# Patient Record
Sex: FEMALE | Race: WHITE | NOT HISPANIC OR LATINO | Employment: OTHER | URBAN - METROPOLITAN AREA
[De-identification: names, ages, dates, MRNs, and addresses within clinical notes are randomized per-mention and may not be internally consistent; named-entity substitution may affect disease eponyms.]

---

## 2019-07-18 ENCOUNTER — APPOINTMENT (EMERGENCY)
Dept: RADIOLOGY | Facility: HOSPITAL | Age: 74
End: 2019-07-18
Payer: COMMERCIAL

## 2019-07-18 ENCOUNTER — HOSPITAL ENCOUNTER (EMERGENCY)
Facility: HOSPITAL | Age: 74
Discharge: HOME | End: 2019-07-18
Attending: EMERGENCY MEDICINE | Admitting: EMERGENCY MEDICINE
Payer: COMMERCIAL

## 2019-07-18 ENCOUNTER — APPOINTMENT (EMERGENCY)
Dept: CT IMAGING | Facility: HOSPITAL | Age: 74
End: 2019-07-18
Payer: COMMERCIAL

## 2019-07-18 VITALS
HEART RATE: 60 BPM | WEIGHT: 138 LBS | BODY MASS INDEX: 27.09 KG/M2 | HEIGHT: 60 IN | OXYGEN SATURATION: 99 % | TEMPERATURE: 98.2 F | SYSTOLIC BLOOD PRESSURE: 150 MMHG | DIASTOLIC BLOOD PRESSURE: 78 MMHG | RESPIRATION RATE: 16 BRPM
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
## 2019-07-18 DIAGNOSIS — S46.911A RIGHT SHOULDER STRAIN: ICD-10-CM

## 2019-07-18 DIAGNOSIS — S01.81XA FACIAL LACERATION: ICD-10-CM

## 2019-07-18 PROCEDURE — 72125 CT NECK SPINE W/O DYE: CPT

## 2019-07-18 PROCEDURE — 99284 EMERGENCY DEPT VISIT MOD MDM: CPT | Performed by: EMERGENCY MEDICINE

## 2019-07-18 PROCEDURE — 99284 EMERGENCY DEPT VISIT MOD MDM: CPT

## 2019-07-18 PROCEDURE — 73030 X-RAY EXAM OF SHOULDER: CPT

## 2019-07-18 PROCEDURE — 96372 THER/PROPH/DIAG INJ SC/IM: CPT

## 2019-07-18 PROCEDURE — 12011 RPR F/E/E/N/L/M 2.5 CM/<: CPT | Performed by: EMERGENCY MEDICINE

## 2019-07-18 PROCEDURE — 70450 CT HEAD/BRAIN W/O DYE: CPT

## 2019-07-18 RX ORDER — KETOROLAC TROMETHAMINE 30 MG/ML
15 INJECTION, SOLUTION INTRAMUSCULAR; INTRAVENOUS ONCE
Status: COMPLETED | OUTPATIENT
Start: 2019-07-18 | End: 2019-07-18

## 2019-07-18 RX ORDER — IBUPROFEN 400 MG/1
400 TABLET ORAL ONCE
Status: DISCONTINUED | OUTPATIENT
Start: 2019-07-18 | End: 2019-07-18

## 2019-07-18 RX ORDER — LIDOCAINE HYDROCHLORIDE 10 MG/ML
10 INJECTION, SOLUTION EPIDURAL; INFILTRATION; INTRACAUDAL; PERINEURAL ONCE
Status: DISCONTINUED | OUTPATIENT
Start: 2019-07-18 | End: 2019-07-18 | Stop reason: HOSPADM

## 2019-07-18 RX ADMIN — KETOROLAC TROMETHAMINE 15 MG: 30 INJECTION, SOLUTION INTRAMUSCULAR at 14:02

## 2019-07-18 NOTE — ED PROVIDER NOTES
History  Chief Complaint   Patient presents with    Fall     PT reports being tripped by her dog, falling omn her right side onto pvement  C/o right shoulder pain, lac to side of head, and knee pain  Denies thinner or LOC  47-year-old female presenting to the emergency department for evaluation of fall and head injury  Patient had mechanical fall as she tripped over her dog, fell onto her right upper extremity and also struck her head  Patient denies loss of consciousness syncope or presyncopal type symptoms  Patient sustained a small laceration to her right temple  Patient also injured her right shoulder  Patient denies any numbness weakness or tingling in any of her extremities  Has full strength and sensation of the right upper extremity  Denies any dizziness nausea vomiting or visual changes  Patient does not know her last tetanus is but is declining a tetanus update today  Patient does not take any blood thinning medications  Patient does have history of rotator cuff surgery on the affected shoulder  None       Past Medical History:   Diagnosis Date    Hyperlipidemia        History reviewed  No pertinent surgical history  History reviewed  No pertinent family history  I have reviewed and agree with the history as documented  Social History     Tobacco Use    Smoking status: Never Smoker    Smokeless tobacco: Never Used   Substance Use Topics    Alcohol use: Yes     Comment: occ    Drug use: Never        Review of Systems   Constitutional: Negative for appetite change, chills, fatigue and fever  HENT: Negative for sneezing and sore throat  Eyes: Negative for visual disturbance  Respiratory: Negative for cough, choking, chest tightness, shortness of breath and wheezing  Cardiovascular: Negative for chest pain and palpitations  Gastrointestinal: Negative for abdominal pain, constipation, diarrhea, nausea and vomiting     Genitourinary: Negative for difficulty urinating and dysuria  Musculoskeletal: Positive for arthralgias  Skin: Positive for wound  Neurological: Negative for dizziness, weakness, light-headedness, numbness and headaches  All other systems reviewed and are negative  Physical Exam  Physical Exam   Constitutional: She is oriented to person, place, and time  She appears well-developed and well-nourished  No distress  HENT:   Head: Normocephalic  Mouth/Throat: Oropharynx is clear and moist    Eyes: Pupils are equal, round, and reactive to light  EOM are normal    Neck: No JVD present  No tracheal deviation present  Cardiovascular: Normal rate, regular rhythm, normal heart sounds and intact distal pulses  Exam reveals no gallop and no friction rub  No murmur heard  Pulmonary/Chest: Effort normal and breath sounds normal  No respiratory distress  She has no wheezes  She has no rales  Abdominal: Soft  Bowel sounds are normal  She exhibits no distension  There is no tenderness  There is no rebound and no guarding  Musculoskeletal:        Right shoulder: She exhibits tenderness  She exhibits normal range of motion, no bony tenderness, no swelling, no effusion and no deformity  Neurological: She is alert and oriented to person, place, and time  No cranial nerve deficit  She exhibits normal muscle tone  Skin: Skin is warm and dry  She is not diaphoretic  No pallor  Psychiatric: She has a normal mood and affect  Her behavior is normal    Nursing note and vitals reviewed        Vital Signs  ED Triage Vitals   Temperature Pulse Respirations Blood Pressure SpO2   07/18/19 1301 07/18/19 1301 07/18/19 1301 07/18/19 1301 07/18/19 1301   98 2 °F (36 8 °C) 60 16 150/78 99 %      Temp Source Heart Rate Source Patient Position - Orthostatic VS BP Location FiO2 (%)   07/18/19 1301 07/18/19 1301 07/18/19 1301 07/18/19 1301 --   Oral Monitor Sitting Left arm       Pain Score       07/18/19 1302       Worst Possible Pain           Vitals: 07/18/19 1301   BP: 150/78   Pulse: 60   Patient Position - Orthostatic VS: Sitting         Visual Acuity      ED Medications  Medications   ketorolac (TORADOL) injection 15 mg (15 mg Intramuscular Given 7/18/19 1402)       Diagnostic Studies  Results Reviewed     None                 XR shoulder 2+ views RIGHT   Final Result by Sameera Cornejo MD (07/18 2163)      No acute osseous abnormality  Workstation performed: [id number]         CT head without contrast   Final Result by Paloma Ferris MD (07/18 8052)      No acute intracranial process  No skull fracture  Chronic microangiopathy  Workstation performed: [id number]         CT spine cervical without contrast   Final Result by Paloma Ferris MD (07/18 5020)      No cervical spine fracture or traumatic malalignment  Multilevel cervical degenerative changes most prominent at C5-6 and C6-7  Minimal grade 1 degenerative anterolisthesis C4 on C5       9 mm probable nodular scarring right pulmonary apex with unknown long-term stability  Based on current Fleischner Society 2017 Guidelines on incidental pulmonary nodule, followup non-contrast CT is recommended at 6-12 months from the initial examination    and, if stable at that time, an additional followup is recommended for 18-24 months from the initial examination  The examination demonstrates a significant  finding and was documented as such in [facility] for liaison and referring practitioner notification  Workstation performed: [id number]                    Procedures  Laceration repair  Date/Time: 7/18/2019 2:56 PM  Performed by: Marrian Leventhal, MD  Authorized by: Marrian Leventhal, MD   Consent: Verbal consent obtained    Consent given by: patient  Patient understanding: patient states understanding of the procedure being performed  Patient identity confirmed: verbally with patient  Body area: head/neck  Location details: forehead  Laceration length: 2 cm  Foreign bodies: no foreign bodies  Tendon involvement: none  Nerve involvement: none  Vascular damage: no  Anesthesia: local infiltration    Anesthesia:  Local Anesthetic: lidocaine 1% without epinephrine    Sedation:  Patient sedated: no      Wound Dehiscence:  Superficial Wound Dehiscence: simple closure      Procedure Details:  Irrigation solution: saline  Irrigation method: jet lavage  Amount of cleaning: standard  Debridement: none  Degree of undermining: none  Skin closure: 6-0 nylon  Number of sutures: 3  Technique: simple  Approximation: close  Approximation difficulty: simple  Patient tolerance: Patient tolerated the procedure well with no immediate complications             ED Course                               MDM  Number of Diagnoses or Management Options  Facial laceration:   Fall:   Right shoulder strain:   Diagnosis management comments: 58-year-old female presenting to the emergency department for evaluation of shoulder pain and head injury after mechanical fall  Will check CT head C-spine, x-ray shoulder, suture laceration as described above, sling for comfort as needed, follow up with her orthopedic surgeon for her shoulder pain       Disposition  Final diagnoses:   Fall   Facial laceration   Right shoulder strain     Time reflects when diagnosis was documented in both MDM as applicable and the Disposition within this note     Time User Action Codes Description Comment    7/18/2019  2:56 PM Jose [address] [place] [address] [C36  UZVZ] Fall     7/18/2019  2:56 PM Quique Garcia Add [F03 [id number]] Facial laceration     7/18/2019  2:58 PM [place] Add [Y36 204C] Right shoulder strain       ED Disposition     ED Disposition Condition Date/Time Comment    Discharge Stable u Jul 18, 2019  2:56 PM Gilbert Batista discharge to home/self care              Follow-up Information     Follow up With Specialties Details Why Contact Info Additional Information    [address] [facility] Emergency Department Emergency Medicine In 1 week Please return to the ED or your PCP in 5-7 days for suture removal [address] [facility]  [phone] [place] ED, [facility]    [address]           There are no discharge medications for this patient  No discharge procedures on file      ED Provider  Electronically Signed by           Tarun Lynch MD  07/18/19 5555

## 2021-10-26 ENCOUNTER — TELEPHONE (OUTPATIENT)
Dept: FAMILY MEDICINE CLINIC | Facility: CLINIC | Age: 76
End: 2021-10-26

## 2024-09-18 ENCOUNTER — OFFICE VISIT (OUTPATIENT)
Dept: URGENT CARE | Facility: CLINIC | Age: 79
End: 2024-09-18
Payer: COMMERCIAL

## 2024-09-18 VITALS
SYSTOLIC BLOOD PRESSURE: 108 MMHG | OXYGEN SATURATION: 99 % | HEART RATE: 75 BPM | TEMPERATURE: 98 F | RESPIRATION RATE: 18 BRPM | DIASTOLIC BLOOD PRESSURE: 64 MMHG

## 2024-09-18 DIAGNOSIS — Z48.02 ENCOUNTER FOR STAPLE REMOVAL: ICD-10-CM

## 2024-09-18 DIAGNOSIS — S01.01XD LACERATION OF SCALP, SUBSEQUENT ENCOUNTER: Primary | ICD-10-CM

## 2024-09-18 PROCEDURE — 99203 OFFICE O/P NEW LOW 30 MIN: CPT | Performed by: PHYSICIAN ASSISTANT

## 2024-09-18 RX ORDER — TRAMADOL HYDROCHLORIDE 50 MG/1
50 TABLET ORAL EVERY 12 HOURS PRN
COMMUNITY
Start: 2024-08-23

## 2024-09-18 RX ORDER — GABAPENTIN 100 MG/1
100 CAPSULE ORAL
COMMUNITY
Start: 2024-09-10

## 2024-09-18 NOTE — PROGRESS NOTES
[facility]        NAME: Iar Wills is a 79 y.o. female  : 1945    MRN: [id number]  DATE: 2024  TIME: 10:31 AM      Assessment and Plan     Laceration of scalp, subsequent encounter [S01.01XD]  1. Laceration of scalp, subsequent encounter        2. Encounter for staple removal  Suture removal        Note:   Removed 4 staples without complications     Patient Instructions   There are no Patient Instructions on file for this visit.     Follow up with primary care provider.   Go to ER if symptoms worsen.    Chief Complaint     Chief Complaint   Patient presents with    Suture / Staple Removal     Pt  had staples in head 10 days ago and 1 feel out yesterday and needs them removed.          History of Present Illness     Patient presents for staple removal from the posterior scalp. She was seen in the ER on 9/10/2024 for a laceration of her head. CT scans were normal. 4 staples were placed.         Review of Systems     Review of Systems   Constitutional:  Negative for chills, fatigue and fever.   HENT:  Negative for congestion, ear pain, postnasal drip, rhinorrhea, sinus pressure, sinus pain and sore throat.    Eyes:  Negative for pain and visual disturbance.   Respiratory:  Negative for cough, chest tightness and shortness of breath.    Cardiovascular:  Negative for chest pain and palpitations.   Gastrointestinal:  Negative for abdominal pain, diarrhea, nausea and vomiting.   Genitourinary:  Negative for dysuria and hematuria.   Musculoskeletal:  Negative for arthralgias, back pain and myalgias.   Skin:  Positive for wound. Negative for rash.   Neurological:  Negative for dizziness, seizures, syncope, numbness and headaches.   All other systems reviewed and are negative.        Current Medications       Current Outpatient Medications:     gabapentin (NEURONTIN) 100 mg capsule, Take 100 mg by mouth daily at bedtime, Disp: , Rfl:     traMADol (ULTRAM) 50 mg tablet, Take 50 mg by mouth  "every 12 (twelve) hours as needed, Disp: , Rfl:     Current Allergies     Allergies as of 09/18/2024 - Reviewed 09/18/2024   Allergen Reaction Noted    Codeine GI Intolerance 01/09/2012              The following portions of the patient's history were reviewed and updated as appropriate: allergies, current medications, past family history, past medical history, past social history, past surgical history, and problem list.     Past Medical History:   Diagnosis Date    Hyperlipidemia        History reviewed. No pertinent surgical history.    History reviewed. No pertinent family history.      Medications have been verified.        Objective     /64   Pulse 75   Temp 98 °F (36.7 °C)   Resp 18   SpO2 99%   No LMP recorded. Patient is postmenopausal.         Physical Exam     Physical Exam  Vitals and nursing note reviewed.   Constitutional:       Appearance: Normal appearance. She is normal weight.   HENT:      Head: Normocephalic.      Comments: Posterior scalp with 4cm laceration with 4 staples intact. No evidence of dehiscence or infection.   Cardiovascular:      Rate and Rhythm: Normal rate.   Pulmonary:      Effort: Pulmonary effort is normal.   Skin:     General: Skin is warm and dry.   Neurological:      General: No focal deficit present.      Mental Status: She is alert and oriented to person, place, and time.   Psychiatric:         Mood and Affect: Mood normal.         Behavior: Behavior normal.       Suture removal    Date/Time: 9/18/2024 9:30 AM    Performed by: Danette Schaffer PA-C  Authorized by: Danette Schaffer PA-C  Universal Protocol:  Procedure performed by: (LAURIE Gallego)  Consent: Verbal consent obtained.  Risks and benefits: risks, benefits and alternatives were discussed  Consent given by: patient  Time out: Immediately prior to procedure a \"time out\" was called to verify the correct patient, procedure, equipment, support staff and site/side marked as required.  Patient " understanding: patient states understanding of the procedure being performed  Patient consent: the patient's understanding of the procedure matches consent given  Procedure consent: procedure consent matches procedure scheduled  Patient identity confirmed: verbally with patient      Patient location:  Clinic  Location:     Laterality:  Median    Location:  Head/neck    Head/neck location:  Scalp  Procedure details:     Tools used:  Other (comment) (staple remover)    Wound appearance:  No sign(s) of infection, good wound healing and clean    Number of staples removed:  4  Post-procedure details:     Post-removal:  No dressing applied    Patient tolerance of procedure:  Tolerated well, no immediate complications